# Patient Record
Sex: MALE | Race: WHITE | NOT HISPANIC OR LATINO | Employment: OTHER | URBAN - METROPOLITAN AREA
[De-identification: names, ages, dates, MRNs, and addresses within clinical notes are randomized per-mention and may not be internally consistent; named-entity substitution may affect disease eponyms.]

---

## 2021-05-29 ENCOUNTER — RECORDS - HEALTHEAST (OUTPATIENT)
Dept: ADMINISTRATIVE | Facility: CLINIC | Age: 36
End: 2021-05-29

## 2021-06-02 ENCOUNTER — RECORDS - HEALTHEAST (OUTPATIENT)
Dept: ADMINISTRATIVE | Facility: CLINIC | Age: 36
End: 2021-06-02

## 2024-10-20 ENCOUNTER — HOSPITAL ENCOUNTER (EMERGENCY)
Facility: CLINIC | Age: 39
Discharge: HOME | End: 2024-10-20
Attending: EMERGENCY MEDICINE | Admitting: EMERGENCY MEDICINE

## 2024-10-20 ENCOUNTER — APPOINTMENT (OUTPATIENT)
Dept: ULTRASOUND IMAGING | Facility: CLINIC | Age: 39
End: 2024-10-20
Attending: EMERGENCY MEDICINE

## 2024-10-20 VITALS
TEMPERATURE: 97.9 F | DIASTOLIC BLOOD PRESSURE: 78 MMHG | SYSTOLIC BLOOD PRESSURE: 138 MMHG | BODY MASS INDEX: 28.44 KG/M2 | WEIGHT: 210 LBS | RESPIRATION RATE: 20 BRPM | HEIGHT: 72 IN | OXYGEN SATURATION: 97 % | HEART RATE: 93 BPM

## 2024-10-20 DIAGNOSIS — N45.3 ORCHITIS AND EPIDIDYMITIS: ICD-10-CM

## 2024-10-20 LAB
ALBUMIN UR-MCNC: 50 MG/DL
APPEARANCE UR: CLEAR
BACTERIA #/AREA URNS HPF: ABNORMAL /HPF
BILIRUB UR QL STRIP: NEGATIVE
COLOR UR AUTO: YELLOW
GLUCOSE UR STRIP-MCNC: NEGATIVE MG/DL
HGB UR QL STRIP: NEGATIVE
HOLD SPECIMEN: NORMAL
KETONES UR STRIP-MCNC: NEGATIVE MG/DL
LEUKOCYTE ESTERASE UR QL STRIP: ABNORMAL
MUCOUS THREADS #/AREA URNS LPF: PRESENT /LPF
NITRATE UR QL: NEGATIVE
PH UR STRIP: 6.5 [PH] (ref 5–7)
RBC URINE: 3 /HPF
SP GR UR STRIP: 1.03 (ref 1–1.03)
UROBILINOGEN UR STRIP-MCNC: 2 MG/DL
WBC URINE: 37 /HPF

## 2024-10-20 PROCEDURE — 99285 EMERGENCY DEPT VISIT HI MDM: CPT | Mod: 25

## 2024-10-20 PROCEDURE — 87186 SC STD MICRODIL/AGAR DIL: CPT | Performed by: EMERGENCY MEDICINE

## 2024-10-20 PROCEDURE — 250N000013 HC RX MED GY IP 250 OP 250 PS 637: Performed by: EMERGENCY MEDICINE

## 2024-10-20 PROCEDURE — 96365 THER/PROPH/DIAG IV INF INIT: CPT

## 2024-10-20 PROCEDURE — 81001 URINALYSIS AUTO W/SCOPE: CPT | Performed by: EMERGENCY MEDICINE

## 2024-10-20 PROCEDURE — 250N000011 HC RX IP 250 OP 636: Performed by: EMERGENCY MEDICINE

## 2024-10-20 PROCEDURE — 93976 VASCULAR STUDY: CPT

## 2024-10-20 PROCEDURE — 96375 TX/PRO/DX INJ NEW DRUG ADDON: CPT

## 2024-10-20 RX ORDER — ONDANSETRON 2 MG/ML
4 INJECTION INTRAMUSCULAR; INTRAVENOUS ONCE
Status: COMPLETED | OUTPATIENT
Start: 2024-10-20 | End: 2024-10-20

## 2024-10-20 RX ORDER — DOXYCYCLINE 100 MG/1
100 CAPSULE ORAL ONCE
Status: COMPLETED | OUTPATIENT
Start: 2024-10-20 | End: 2024-10-20

## 2024-10-20 RX ORDER — HYDROCODONE BITARTRATE AND ACETAMINOPHEN 5; 325 MG/1; MG/1
1-2 TABLET ORAL EVERY 4 HOURS PRN
Qty: 18 TABLET | Refills: 0 | Status: SHIPPED | OUTPATIENT
Start: 2024-10-20 | End: 2024-10-23

## 2024-10-20 RX ORDER — LEVOFLOXACIN 500 MG/1
500 TABLET, FILM COATED ORAL DAILY
Qty: 10 TABLET | Refills: 0 | Status: SHIPPED | OUTPATIENT
Start: 2024-10-20 | End: 2024-10-30

## 2024-10-20 RX ORDER — CEFTRIAXONE 1 G/1
1 INJECTION, POWDER, FOR SOLUTION INTRAMUSCULAR; INTRAVENOUS ONCE
Status: COMPLETED | OUTPATIENT
Start: 2024-10-20 | End: 2024-10-20

## 2024-10-20 RX ADMIN — HYDROMORPHONE HYDROCHLORIDE 1 MG: 1 INJECTION, SOLUTION INTRAMUSCULAR; INTRAVENOUS; SUBCUTANEOUS at 02:21

## 2024-10-20 RX ADMIN — DOXYCYCLINE HYCLATE 100 MG: 100 CAPSULE ORAL at 03:27

## 2024-10-20 RX ADMIN — CEFTRIAXONE 1 G: 1 INJECTION, POWDER, FOR SOLUTION INTRAMUSCULAR; INTRAVENOUS at 03:27

## 2024-10-20 RX ADMIN — ONDANSETRON 4 MG: 2 INJECTION, SOLUTION INTRAMUSCULAR; INTRAVENOUS at 02:21

## 2024-10-20 ASSESSMENT — COLUMBIA-SUICIDE SEVERITY RATING SCALE - C-SSRS
6. HAVE YOU EVER DONE ANYTHING, STARTED TO DO ANYTHING, OR PREPARED TO DO ANYTHING TO END YOUR LIFE?: NO
2. HAVE YOU ACTUALLY HAD ANY THOUGHTS OF KILLING YOURSELF IN THE PAST MONTH?: NO
1. IN THE PAST MONTH, HAVE YOU WISHED YOU WERE DEAD OR WISHED YOU COULD GO TO SLEEP AND NOT WAKE UP?: NO

## 2024-10-20 ASSESSMENT — ACTIVITIES OF DAILY LIVING (ADL)
ADLS_ACUITY_SCORE: 35

## 2024-10-20 NOTE — DISCHARGE INSTRUCTIONS
Elevate your scrotum with a pillow as much as possible over the next couple days  Ibuprofen 600 mg every 6 hours as needed for pain  Return to the emergency department for worsening problems or concerns

## 2024-10-20 NOTE — ED TRIAGE NOTES
PT states that on the 18th he was sitting on the ground working on his motorcycle when he states that it felt like he was sitting on his testicle. Pt states that it felt like it tried to go up in and didn't. PT states since then his left testicle has been painful and getting worse with time. Denies urination difficulties     Triage Assessment (Adult)       Row Name 10/20/24 0133          Triage Assessment    Airway WDL WDL        Respiratory WDL    Respiratory WDL WDL        Skin Circulation/Temperature WDL    Skin Circulation/Temperature WDL WDL        Cardiac WDL    Cardiac WDL WDL        Peripheral/Neurovascular WDL    Peripheral Neurovascular WDL WDL        Cognitive/Neuro/Behavioral WDL    Cognitive/Neuro/Behavioral WDL WDL

## 2024-10-20 NOTE — ED PROVIDER NOTES
EMERGENCY DEPARTMENT ENCOUnter      NAME: Ray Nails Jr.  AGE: 38 year old male  YOB: 1985  MRN: [id number]  EVALUATION DATE & TIME: 10/20/2024  1:35 AM    PCP: No Ref-Primary, Physician    ED PROVIDER: Hafsa Rivas MD      Chief Complaint   Patient presents with    Groin Swelling         FINAL IMPRESSION:  1. Orchitis and epididymitis          ED COURSE & MEDICAL DECISION MAKING:      In summary, the patient is a 38-year-old male that presents to the emergency department for evaluation of left testicular swelling and pain thought secondary to epididymitis and orchitis.  We will treat with oral antibiotics and close outpatient follow-up with primary care.  0155-I met with the patient, obtained history, performed an initial exam, and discussed options and plan for diagnostics and treatment here in the ED. 1 mg IV was administered for pain.  Zofran 4 mg IV was administered for nausea.  Ceftriaxone 1 g IV was administered for possible urinary tract infection.  Doxycycline 100 mg p.o. was administered for possible STI.  0344-I updated the patient on their results.  Stated that it was unlikely that he would have STI since he is in a monogamous relationship.  I also discussed discharge and the patient is agreeable. Reviewed supportive cares, symptomatic treatment, outpatient follow up, and reasons to return to the Emergency Department. All questions and concerns were addressed. Patient to be discharged by ED RN.     Medical Decision Making  Obtained supplemental history:Supplemental history obtained?: No  Reviewed external records: External records reviewed?: Documented in chart  Care impacted by chronic illness:Documented in Chart  Did you consider but not order tests?: Work up considered but not performed and documented in chart, if applicable  Did you interpret images independently?: Independent interpretation of ECG and images noted in documentation, when applicable.  Consultation  discussion with other provider:Did you involve another provider (consultant, , pharmacy, etc.)?: No  Discharge. I prescribed additional prescription strength medication(s) as charted. See documentation for any additional details.    MIPS: Not Applicable        At the conclusion of the encounter I discussed the results of all of the tests and the disposition. The questions were answered. The patient or family acknowledged understanding and was agreeable with the care plan.         MEDICATIONS GIVEN IN THE EMERGENCY:  Medications   HYDROmorphone (DILAUDID) injection 1 mg (1 mg Intravenous $Given 10/20/24 0221)   ondansetron (ZOFRAN) injection 4 mg (4 mg Intravenous $Given 10/20/24 0221)   cefTRIAXone (ROCEPHIN) 1 g vial to attach to  mL bag for ADULTS or NS 50 mL bag for PEDS (0 g Intravenous Stopped 10/20/24 0358)   doxycycline hyclate (VIBRAMYCIN) capsule 100 mg (100 mg Oral $Given 10/20/24 0327)       NEW PRESCRIPTIONS STARTED AT TODAY'S ER VISIT  Discharge Medication List as of 10/20/2024  3:58 AM        START taking these medications    Details   HYDROcodone-acetaminophen (NORCO) 5-325 MG tablet Take 1-2 tablets by mouth every 4 hours as needed for moderate to severe pain., Disp-18 tablet, R-0, Local Print      levofloxacin (LEVAQUIN) 500 MG tablet Take 1 tablet (500 mg) by mouth daily for 10 days., Disp-10 tablet, R-0, Local Print                =================================================================    HPI        Ray Nails Jr. is a 38 year old male with no pertinent history who presents to this ED via walk-in for evaluation of groin swelling.    The patient developed some left groin swelling and discomfort 2 days ago after working on his motorcycle. He was sitting for a long period of time and initially noticed his pain after standing up. He can be in a pain free state while being in a certain position/posture. He rates his pain a 8/10. He denies any known trauma to the groin area. No  complaints of penile discharge, urinary difficulties, hematuria, abdominal pain, diaphoresis, or any other associated symptoms at this time.    No significant medical history. No medications taken on a daily basis. He does not smoke cigarettes or drink alcohol.    REVIEW OF SYSTEMS     Constitutional:  Denies fever or chills, diaphoresis  HENT:  Denies sore throat   Respiratory:  Denies cough or shortness of breath   Cardiovascular:  Denies chest pain or palpitations  GI:  Denies abdominal pain, nausea, or vomiting  : Positive for left groin pain and swelling. Negative for penile discharge, urinary difficulty, hematuria.  Musculoskeletal:  Denies any new extremity pain   Skin:  Denies rash   Neurologic:  Denies headache, focal weakness or sensory changes    All other systems reviewed and are negative      PAST MEDICAL HISTORY:  Reviewed and nothing pertinent    CURRENT MEDICATIONS:    HYDROcodone-acetaminophen (NORCO) 5-325 MG tablet  levofloxacin (LEVAQUIN) 500 MG tablet        ALLERGIES:  No Known Allergies    FAMILY HISTORY:  No family history on file.    SOCIAL HISTORY:   Social History     Socioeconomic History    Marital status: Single       VITALS:  Patient Vitals for the past 24 hrs:   BP Temp Temp src Pulse Resp SpO2 Height Weight   10/20/24 0400 138/78 -- -- 93 -- 97 % -- --   10/20/24 0345 (!) 147/86 -- -- 88 -- 95 % -- --   10/20/24 0244 (!) 154/87 -- -- 92 -- 91 % -- --   10/20/24 0230 (!) 152/91 -- -- 90 -- 91 % -- --   10/20/24 0221 (!) 141/87 -- -- -- -- -- -- --   10/20/24 0132 132/87 97.9  F (36.6  C) Oral 107 20 98 % 1.829 m (6') 95.3 kg (210 lb)       PHYSICAL EXAM    Constitutional:  Well developed, Well nourished,  HENT:  Normocephalic, Atraumatic, Bilateral external ears normal, Oropharynx moist, Nose normal.   Neck:  Normal range of motion, No meningismus, No stridor.   Eyes:  EOMI, Conjunctiva normal, No discharge.   Respiratory:  Normal breath sounds, No respiratory distress, No wheezing,  No chest tenderness.   Cardiovascular:  Normal heart rate, Normal rhythm, No murmurs  GI:  Soft, No tenderness, No guarding, No CVA tenderness.   Musculoskeletal:  Neurovascularly intact distally, No edema, No tenderness, No cyanosis, Good range of motion in all major joints.   Integument:  Warm, Dry, No erythema, No rash.   Lymphatic:  No lymphadenopathy noted.   Neurologic:  Alert & oriented , Normal motor function, Normal sensory function, No focal deficits noted.   Psychiatric:  Affect normal, Judgment normal, Mood normal.   -edematous and erythematous scrotum with tender left scrotum and no masses appreciated.  No penile discharge appreciated.  No inguinal hernias appreciated     LAB:  All pertinent labs reviewed and interpreted.  Results for orders placed or performed during the hospital encounter of 10/20/24   US Testicular & Scrotum w Doppler Ltd    Impression    IMPRESSION:  1.  Hyperemia and increased flow of the left epididymis concerning for epididymitis with a concurrent small the moderate complex left hydrocele present.  2.  Sonographically unremarkable testicles    UA with Microscopic reflex to Culture    Specimen: Urine, Midstream   Result Value Ref Range    Color Urine Yellow Colorless, Straw, Light Yellow, Yellow    Appearance Urine Clear Clear    Glucose Urine Negative Negative mg/dL    Bilirubin Urine Negative Negative    Ketones Urine Negative Negative mg/dL    Specific Gravity Urine 1.026 1.001 - 1.030    Blood Urine Negative Negative    pH Urine 6.5 5.0 - 7.0    Protein Albumin Urine 50 (A) Negative mg/dL    Urobilinogen Urine 2.0 (A) <2.0 mg/dL    Nitrite Urine Negative Negative    Leukocyte Esterase Urine 250 Marisel/uL (A) Negative    Bacteria Urine Few (A) None Seen /HPF    Mucus Urine Present (A) None Seen /LPF    RBC Urine 3 (H) <=2 /HPF    WBC Urine 37 (H) <=5 /HPF   Extra Blue Top Tube   Result Value Ref Range    Hold Specimen JIC    Extra Red Top Tube   Result Value Ref Range    Hold  Specimen JIC    Extra Green Top (Lithium Heparin) Tube   Result Value Ref Range    Hold Specimen JIC    Extra Purple Top Tube   Result Value Ref Range    Hold Specimen JIC        RADIOLOGY:  I have independently reviewed and interpreted the above imaging, pending the final radiology read.  US Testicular & Scrotum w Doppler Ltd   Final Result   IMPRESSION:   1.  Hyperemia and increased flow of the left epididymis concerning for epididymitis with a concurrent small the moderate complex left hydrocele present.   2.  Sonographically unremarkable testicles                 I, Marlon Rojas, am serving as a scribe to document services personally performed by Dr. Rivas based on my observation and the provider's statements to me. I, Hafsa Rivas MD attest that Marlon Rojas is acting in a scribe capacity, has observed my performance of the services and has documented them in accordance with my direction.    Hafsa Rivas MD  Emergency Medicine  [facility] EMERGENCY ROOM  [address] [facility] [id number] [phone]  Dept: [phone]     Hafsa Rivas MD  10/20/24 0632

## 2024-10-21 LAB — BACTERIA UR CULT: ABNORMAL

## 2024-10-22 ENCOUNTER — TELEPHONE (OUTPATIENT)
Dept: NURSING | Facility: CLINIC | Age: 39
End: 2024-10-22

## 2024-10-22 NOTE — TELEPHONE ENCOUNTER
[facility]     Reason for call: Lab Result Notification     Lab Result (including Rx patient on, if applicable).  If culture, copy of lab report at bottom.  Lab Result: See below  Levofloxacin (Levaquin) 500 mg PO tablet, 1 tablet (500 mg) by mouth once daily for 10 days. SUSCEPTIBLE     RN Recommendations/Instructions per [facility] ED lab result protocol:   [facility] ED lab result protocol utilized: urine culture    Unable to reach patient/caregiver.     Left voicemail message requesting a call back to [phone] between 9 a.m. and 5:30 p.m. for patient's ED/UC lab results.    Letter pended to be sent via [employer] mail.       Anil Wilcox RN

## 2024-10-23 NOTE — TELEPHONE ENCOUNTER
10/23/24 9:16 am 2nd Attempt, unable to reach patient, VM left to call Emergency Department Results Team back.  Gabby Jensen RN  Emergency Department Results Team

## 2024-10-24 ENCOUNTER — HOSPITAL ENCOUNTER (EMERGENCY)
Facility: HOSPITAL | Age: 39
Discharge: HOME | End: 2024-10-24
Attending: EMERGENCY MEDICINE | Admitting: EMERGENCY MEDICINE

## 2024-10-24 VITALS
HEART RATE: 90 BPM | SYSTOLIC BLOOD PRESSURE: 152 MMHG | TEMPERATURE: 98.4 F | HEIGHT: 72 IN | RESPIRATION RATE: 18 BRPM | BODY MASS INDEX: 28.44 KG/M2 | OXYGEN SATURATION: 96 % | DIASTOLIC BLOOD PRESSURE: 100 MMHG | WEIGHT: 210 LBS

## 2024-10-24 DIAGNOSIS — S61.211A LACERATION OF LEFT INDEX FINGER WITHOUT FOREIGN BODY WITHOUT DAMAGE TO NAIL, INITIAL ENCOUNTER: ICD-10-CM

## 2024-10-24 PROCEDURE — 12001 RPR S/N/AX/GEN/TRNK 2.5CM/<: CPT

## 2024-10-24 PROCEDURE — 90471 IMMUNIZATION ADMIN: CPT | Performed by: EMERGENCY MEDICINE

## 2024-10-24 PROCEDURE — 250N000011 HC RX IP 250 OP 636: Performed by: EMERGENCY MEDICINE

## 2024-10-24 PROCEDURE — 90715 TDAP VACCINE 7 YRS/> IM: CPT | Performed by: EMERGENCY MEDICINE

## 2024-10-24 PROCEDURE — 99283 EMERGENCY DEPT VISIT LOW MDM: CPT | Mod: 25

## 2024-10-24 RX ADMIN — CLOSTRIDIUM TETANI TOXOID ANTIGEN (FORMALDEHYDE INACTIVATED), CORYNEBACTERIUM DIPHTHERIAE TOXOID ANTIGEN (FORMALDEHYDE INACTIVATED), BORDETELLA PERTUSSIS TOXOID ANTIGEN (GLUTARALDEHYDE INACTIVATED), BORDETELLA PERTUSSIS FILAMENTOUS HEMAGGLUTININ ANTIGEN (FORMALDEHYDE INACTIVATED), BORDETELLA PERTUSSIS PERTACTIN ANTIGEN, AND BORDETELLA PERTUSSIS FIMBRIAE 2/3 ANTIGEN 0.5 ML: 5; 2; 2.5; 5; 3; 5 INJECTION, SUSPENSION INTRAMUSCULAR at 07:25

## 2024-10-24 ASSESSMENT — COLUMBIA-SUICIDE SEVERITY RATING SCALE - C-SSRS
6. HAVE YOU EVER DONE ANYTHING, STARTED TO DO ANYTHING, OR PREPARED TO DO ANYTHING TO END YOUR LIFE?: NO
1. IN THE PAST MONTH, HAVE YOU WISHED YOU WERE DEAD OR WISHED YOU COULD GO TO SLEEP AND NOT WAKE UP?: NO
2. HAVE YOU ACTUALLY HAD ANY THOUGHTS OF KILLING YOURSELF IN THE PAST MONTH?: NO

## 2024-10-24 ASSESSMENT — ENCOUNTER SYMPTOMS: WOUND: 1

## 2024-10-24 ASSESSMENT — ACTIVITIES OF DAILY LIVING (ADL): ADLS_ACUITY_SCORE: 0

## 2024-10-24 NOTE — ED PROVIDER NOTES
EMERGENCY DEPARTMENT ENCOUNTER      NAME: Ray Nails Jr.  AGE: 38 year old male  YOB: 1985  MRN: [id number]  EVALUATION DATE & TIME: 10/24/2024  6:47 AM    PCP: No Ref-Primary, Physician    ED PROVIDER: Zev Edmonds DO      Chief Complaint   Patient presents with    Laceration         FINAL IMPRESSION:  1. Laceration of left index finger without foreign body without damage to nail, initial encounter          ED COURSE & MEDICAL DECISION MAKIN-year-old male presented to the ED for evaluation of a finger laceration that occurred just prior to ED arrival.  Patient denied any numbness or weakness or any decreased range of motion noted within the affected finger.  Here in the ED the patient was slightly hypertensive.  He was otherwise hemodynamic stable.  The patient did not appear to be in any obvious distress or discomfort.  On exam the patient was noted to have a superficial laceration noted in the left second finger over the MTP joint space.  There was no joint space involvement or tendon laceration noted.  The patient had no neurovascular deficits noted in the left finger.    The wound was irrigated here in the ED.  The laceration was then closed using Dermabond and Steri-Strips.  The patient was given a finger splint to keep the wound from dehiscing.  The patient's tetanus was updated here in the ED.  Patient was given proper wound care instructions.  The patient was instructed to use over-the-counter ibuprofen in addition to his home hydrocodone as needed for any further pain.  He was instructed to follow-up with his primary care provider for reevaluation or to return back to ED sooner for any worsening signs of infection.    Pertinent Labs & Imaging studies reviewed. (See chart for details)  7:12 AM I met with the patient to gather history and to perform my initial exam. We discussed plans for the ED course, including diagnostic testing and treatment.       At the conclusion of the  encounter I discussed the results of all of the tests and the disposition. The questions were answered. The patient or family acknowledged understanding and was agreeable with the care plan.     Medical Decision Making    History:  Supplemental history from: Friend  External Record(s) reviewed: Other: Immunization Records    Work Up:  Chart documentation includes differential considered and any EKGs or imaging independently interpreted by provider, where specified.  In additional to work up documented, I considered the following work up: Documented in chart, if applicable.    External consultation:  Discussion of management with another provider: Documented in chart, if applicable    Complicating factors:  Care impacted by chronic illness: Documented in Chart  Care affected by social determinants of health: N/A    Disposition considerations: Discharge. No recommendations on prescription strength medication(s). See documentation for any additional details.        MEDICATIONS GIVEN IN THE EMERGENCY:  Medications   Tdap (tetanus-diphtheria-acell pertussis) (ADACEL) injection 0.5 mL (0.5 mLs Intramuscular $Given 10/24/24 0725)       NEW PRESCRIPTIONS STARTED AT TODAY'S ER VISIT  Discharge Medication List as of 10/24/2024  7:32 AM             =================================================================    HPI    Patient information was obtained from: Patient, Friend    Use of : N/A       Ray Nails Jr. is a 38 year old male with no pertinent history who presents to this ED by private car with a friend for evaluation of a laceration.    The patient reports around 0600 this morning he was using a hand saw and accidentally cut the dorsal aspect of the left hand at the base of the second digit. Bleeding was controlled at time of evaluation. He did not sustain any other injuries. His last tetanus was in 2010.    REVIEW OF SYSTEMS   Review of Systems   Skin:  Positive for wound.       PAST MEDICAL  HISTORY:  History reviewed. No pertinent past medical history.    PAST SURGICAL HISTORY:  History reviewed. No pertinent surgical history.        CURRENT MEDICATIONS:    levofloxacin (LEVAQUIN) 500 MG tablet        ALLERGIES:  No Known Allergies    FAMILY HISTORY:  History reviewed. No pertinent family history.    SOCIAL HISTORY:   Social History     Socioeconomic History    Marital status:      Spouse name: None    Number of children: None    Years of education: None    Highest education level: None       VITALS:  BP (!) 152/100   Pulse 90   Temp 98.4  F (36.9  C) (Oral)   Resp 18   Ht 1.829 m (6')   Wt 95.3 kg (210 lb)   SpO2 96%   BMI 28.48 kg/m      PHYSICAL EXAM    General presentation: Alert, Vital signs reviewed. No apparent distress.   HENT: ENT inspection is normal. Oropharynx is moist and clear.   Eye: Pupils are equal and reactive to light. EOMI  Neck: The neck is supple.  Pulmonary: Currently in no acute respiratory distress.   Circulatory: Peripheral pulses are strong and equal in the bilateral upper lower extremities. Capillary refill less than 2 seconds in the 2nd left finger.  Neurologic: Alert, oriented to person, place, and time. No gross motor or sensory deficits noted in the upper or lower extremities. Cranial nerves II through XII are grossly intact. No sensory or motor deficits noted in the fingers on the left hand.  Musculoskeletal: No extremity tenderness. Full range of motion in all extremities. No extremity edema. Full ROM of the MTP, PIP, and DIP joints in the 2nd left finger.  Skin: Skin color is normal. No rash. Warm. Dry to touch. Superficial laceration over the 2nd left MTP joint, no visible tendon lacerations or joint space involvement.      LAB:  All pertinent labs reviewed and interpreted.       RADIOLOGY:  Reviewed all pertinent imaging. Please see official radiology report.  No orders to display       PROCEDURES:   PROCEDURE: Laceration Repair   INDICATIONS:  Laceration   PROCEDURE PROVIDER: Dr Zev Edmonds   SITE: Left 2nd MTP   TYPE/SIZE: simple, clean, and no foreign body visualized  2 cm (total length)   FUNCTIONAL ASSESSMENT: Distal sensation, circulation, motor, and tendon function intact   MEDICATION: None   PREPARATION: irrigation with Normal saline   DEBRIDEMENT: no debridement and wound explored, no foreign body found   CLOSURE:  Superficial layer closed with Steri-strips and Dermabond (medical glue)         I, Giovanny Roman, am serving as a scribe to document services personally performed by Zev Edmonds DO based on my observation and the provider's statements to me. I, Zev Edmonds, attest that Giovanny Roman is acting in a scribe capacity, has observed my performance of the services and has documented them in accordance with my direction.    Zev Edmonds DO  Emergency Medicine  [facility] EMERGENCY DEPARTMENT  [address] [place] [id number]  [phone]       Zev Edmonds DO  10/24/24 0714

## 2024-10-24 NOTE — DISCHARGE INSTRUCTIONS
Please keep the wound as clean and dry as possible over the next week.  Use the finger splint over the next few days to prevent the laceration from opening back up.  The wound should heal over the next week.  Follow-up with your primary care provider or return back to ED for any worsening signs of infection including increased redness, swelling, or pain.

## 2024-10-24 NOTE — ED TRIAGE NOTES
Pt accidentally cut his left pointer finger with a saw. Last tetanus unknown     Triage Assessment (Adult)       Row Name 10/24/24 0643          Triage Assessment    Airway WDL WDL        Respiratory WDL    Respiratory WDL WDL        Skin Circulation/Temperature WDL    Skin Circulation/Temperature WDL X  laceration left pointer finger        Cardiac WDL    Cardiac WDL X  HTN        Peripheral/Neurovascular WDL    Peripheral Neurovascular WDL WDL        Cognitive/Neuro/Behavioral WDL    Cognitive/Neuro/Behavioral WDL WDL

## 2024-10-26 NOTE — TELEPHONE ENCOUNTER
Patient left a voice mail at 6:23pm on 10/25/2024 stating he received a letter in the mail asking him to call. Attempted to return patient's call, but was unable to reach him. Left a message for him to call back between the hours of 9am- 5:30pm.     Aicha Alfred RN  10/26/24 9:33 AM  [facility]  Emergency Department Lab Results RN